# Patient Record
Sex: FEMALE | Race: WHITE | NOT HISPANIC OR LATINO | URBAN - METROPOLITAN AREA
[De-identification: names, ages, dates, MRNs, and addresses within clinical notes are randomized per-mention and may not be internally consistent; named-entity substitution may affect disease eponyms.]

---

## 2017-02-14 ENCOUNTER — OUTPATIENT (OUTPATIENT)
Dept: OUTPATIENT SERVICES | Facility: HOSPITAL | Age: 72
LOS: 1 days | Discharge: HOME | End: 2017-02-14

## 2017-02-14 DIAGNOSIS — H33.20 SEROUS RETINAL DETACHMENT, UNSPECIFIED EYE: Chronic | ICD-10-CM

## 2017-02-14 DIAGNOSIS — Z98.89 OTHER SPECIFIED POSTPROCEDURAL STATES: Chronic | ICD-10-CM

## 2017-06-27 DIAGNOSIS — Z09 ENCOUNTER FOR FOLLOW-UP EXAMINATION AFTER COMPLETED TREATMENT FOR CONDITIONS OTHER THAN MALIGNANT NEOPLASM: ICD-10-CM

## 2017-06-27 DIAGNOSIS — Z78.0 ASYMPTOMATIC MENOPAUSAL STATE: ICD-10-CM

## 2017-06-27 DIAGNOSIS — Z82.62 FAMILY HISTORY OF OSTEOPOROSIS: ICD-10-CM

## 2017-06-27 DIAGNOSIS — M81.0 AGE-RELATED OSTEOPOROSIS WITHOUT CURRENT PATHOLOGICAL FRACTURE: ICD-10-CM

## 2019-02-19 ENCOUNTER — OUTPATIENT (OUTPATIENT)
Dept: OUTPATIENT SERVICES | Facility: HOSPITAL | Age: 74
LOS: 1 days | Discharge: HOME | End: 2019-02-19

## 2019-02-19 DIAGNOSIS — Z98.89 OTHER SPECIFIED POSTPROCEDURAL STATES: Chronic | ICD-10-CM

## 2019-02-19 DIAGNOSIS — H33.20 SEROUS RETINAL DETACHMENT, UNSPECIFIED EYE: Chronic | ICD-10-CM

## 2019-02-20 DIAGNOSIS — Z78.0 ASYMPTOMATIC MENOPAUSAL STATE: ICD-10-CM

## 2019-02-20 DIAGNOSIS — M81.0 AGE-RELATED OSTEOPOROSIS WITHOUT CURRENT PATHOLOGICAL FRACTURE: ICD-10-CM

## 2019-02-20 DIAGNOSIS — Z09 ENCOUNTER FOR FOLLOW-UP EXAMINATION AFTER COMPLETED TREATMENT FOR CONDITIONS OTHER THAN MALIGNANT NEOPLASM: ICD-10-CM

## 2019-02-20 DIAGNOSIS — Z82.62 FAMILY HISTORY OF OSTEOPOROSIS: ICD-10-CM

## 2019-04-29 ENCOUNTER — EMERGENCY (EMERGENCY)
Facility: HOSPITAL | Age: 74
LOS: 0 days | Discharge: HOME | End: 2019-04-29
Attending: EMERGENCY MEDICINE | Admitting: EMERGENCY MEDICINE
Payer: MEDICARE

## 2019-04-29 VITALS
RESPIRATION RATE: 18 BRPM | TEMPERATURE: 98 F | DIASTOLIC BLOOD PRESSURE: 78 MMHG | HEART RATE: 76 BPM | OXYGEN SATURATION: 99 % | SYSTOLIC BLOOD PRESSURE: 127 MMHG

## 2019-04-29 VITALS
TEMPERATURE: 97 F | DIASTOLIC BLOOD PRESSURE: 77 MMHG | OXYGEN SATURATION: 97 % | RESPIRATION RATE: 18 BRPM | HEART RATE: 77 BPM | SYSTOLIC BLOOD PRESSURE: 141 MMHG

## 2019-04-29 DIAGNOSIS — H33.20 SEROUS RETINAL DETACHMENT, UNSPECIFIED EYE: Chronic | ICD-10-CM

## 2019-04-29 DIAGNOSIS — Z79.82 LONG TERM (CURRENT) USE OF ASPIRIN: ICD-10-CM

## 2019-04-29 DIAGNOSIS — R07.0 PAIN IN THROAT: ICD-10-CM

## 2019-04-29 DIAGNOSIS — Z98.89 OTHER SPECIFIED POSTPROCEDURAL STATES: Chronic | ICD-10-CM

## 2019-04-29 DIAGNOSIS — Z79.899 OTHER LONG TERM (CURRENT) DRUG THERAPY: ICD-10-CM

## 2019-04-29 DIAGNOSIS — E03.9 HYPOTHYROIDISM, UNSPECIFIED: ICD-10-CM

## 2019-04-29 LAB
ANION GAP SERPL CALC-SCNC: 14 MMOL/L — SIGNIFICANT CHANGE UP (ref 7–14)
BASOPHILS # BLD AUTO: 0.06 K/UL — SIGNIFICANT CHANGE UP (ref 0–0.2)
BASOPHILS NFR BLD AUTO: 0.6 % — SIGNIFICANT CHANGE UP (ref 0–1)
BUN SERPL-MCNC: 17 MG/DL — SIGNIFICANT CHANGE UP (ref 10–20)
CALCIUM SERPL-MCNC: 10.1 MG/DL — SIGNIFICANT CHANGE UP (ref 8.5–10.1)
CHLORIDE SERPL-SCNC: 100 MMOL/L — SIGNIFICANT CHANGE UP (ref 98–110)
CO2 SERPL-SCNC: 25 MMOL/L — SIGNIFICANT CHANGE UP (ref 17–32)
CREAT SERPL-MCNC: 1 MG/DL — SIGNIFICANT CHANGE UP (ref 0.7–1.5)
EOSINOPHIL # BLD AUTO: 0.04 K/UL — SIGNIFICANT CHANGE UP (ref 0–0.7)
EOSINOPHIL NFR BLD AUTO: 0.4 % — SIGNIFICANT CHANGE UP (ref 0–8)
GLUCOSE SERPL-MCNC: 97 MG/DL — SIGNIFICANT CHANGE UP (ref 70–99)
HCT VFR BLD CALC: 47.3 % — HIGH (ref 37–47)
HGB BLD-MCNC: 15.5 G/DL — SIGNIFICANT CHANGE UP (ref 12–16)
IMM GRANULOCYTES NFR BLD AUTO: 0.7 % — HIGH (ref 0.1–0.3)
LACTATE SERPL-SCNC: 1.1 MMOL/L — SIGNIFICANT CHANGE UP (ref 0.5–2.2)
LYMPHOCYTES # BLD AUTO: 2.27 K/UL — SIGNIFICANT CHANGE UP (ref 1.2–3.4)
LYMPHOCYTES # BLD AUTO: 23.3 % — SIGNIFICANT CHANGE UP (ref 20.5–51.1)
MCHC RBC-ENTMCNC: 28.4 PG — SIGNIFICANT CHANGE UP (ref 27–31)
MCHC RBC-ENTMCNC: 32.8 G/DL — SIGNIFICANT CHANGE UP (ref 32–37)
MCV RBC AUTO: 86.8 FL — SIGNIFICANT CHANGE UP (ref 81–99)
MONOCYTES # BLD AUTO: 0.8 K/UL — HIGH (ref 0.1–0.6)
MONOCYTES NFR BLD AUTO: 8.2 % — SIGNIFICANT CHANGE UP (ref 1.7–9.3)
NEUTROPHILS # BLD AUTO: 6.49 K/UL — SIGNIFICANT CHANGE UP (ref 1.4–6.5)
NEUTROPHILS NFR BLD AUTO: 66.8 % — SIGNIFICANT CHANGE UP (ref 42.2–75.2)
NRBC # BLD: 0 /100 WBCS — SIGNIFICANT CHANGE UP (ref 0–0)
PLATELET # BLD AUTO: 258 K/UL — SIGNIFICANT CHANGE UP (ref 130–400)
POTASSIUM SERPL-MCNC: 4 MMOL/L — SIGNIFICANT CHANGE UP (ref 3.5–5)
POTASSIUM SERPL-SCNC: 4 MMOL/L — SIGNIFICANT CHANGE UP (ref 3.5–5)
RBC # BLD: 5.45 M/UL — HIGH (ref 4.2–5.4)
RBC # FLD: 13 % — SIGNIFICANT CHANGE UP (ref 11.5–14.5)
SODIUM SERPL-SCNC: 139 MMOL/L — SIGNIFICANT CHANGE UP (ref 135–146)
WBC # BLD: 9.73 K/UL — SIGNIFICANT CHANGE UP (ref 4.8–10.8)
WBC # FLD AUTO: 9.73 K/UL — SIGNIFICANT CHANGE UP (ref 4.8–10.8)

## 2019-04-29 PROCEDURE — 70491 CT SOFT TISSUE NECK W/DYE: CPT | Mod: 26

## 2019-04-29 PROCEDURE — 99284 EMERGENCY DEPT VISIT MOD MDM: CPT

## 2019-04-29 RX ORDER — IBUPROFEN 200 MG
1 TABLET ORAL
Qty: 21 | Refills: 0 | OUTPATIENT
Start: 2019-04-29 | End: 2019-05-05

## 2019-04-29 RX ORDER — ONDANSETRON 8 MG/1
4 TABLET, FILM COATED ORAL ONCE
Qty: 0 | Refills: 0 | Status: COMPLETED | OUTPATIENT
Start: 2019-04-29 | End: 2019-04-29

## 2019-04-29 RX ORDER — KETOROLAC TROMETHAMINE 30 MG/ML
30 SYRINGE (ML) INJECTION ONCE
Qty: 0 | Refills: 0 | Status: DISCONTINUED | OUTPATIENT
Start: 2019-04-29 | End: 2019-04-29

## 2019-04-29 RX ORDER — DEXAMETHASONE 0.5 MG/5ML
10 ELIXIR ORAL ONCE
Qty: 0 | Refills: 0 | Status: COMPLETED | OUTPATIENT
Start: 2019-04-29 | End: 2019-04-29

## 2019-04-29 RX ORDER — AMPICILLIN SODIUM AND SULBACTAM SODIUM 250; 125 MG/ML; MG/ML
3 INJECTION, POWDER, FOR SUSPENSION INTRAMUSCULAR; INTRAVENOUS ONCE
Qty: 0 | Refills: 0 | Status: COMPLETED | OUTPATIENT
Start: 2019-04-29 | End: 2019-04-29

## 2019-04-29 RX ADMIN — Medication 30 MILLIGRAM(S): at 13:35

## 2019-04-29 RX ADMIN — AMPICILLIN SODIUM AND SULBACTAM SODIUM 200 GRAM(S): 250; 125 INJECTION, POWDER, FOR SUSPENSION INTRAMUSCULAR; INTRAVENOUS at 10:29

## 2019-04-29 RX ADMIN — AMPICILLIN SODIUM AND SULBACTAM SODIUM 3 GRAM(S): 250; 125 INJECTION, POWDER, FOR SUSPENSION INTRAMUSCULAR; INTRAVENOUS at 11:00

## 2019-04-29 RX ADMIN — Medication 10 MILLIGRAM(S): at 10:29

## 2019-04-29 RX ADMIN — ONDANSETRON 4 MILLIGRAM(S): 8 TABLET, FILM COATED ORAL at 10:45

## 2019-04-29 RX ADMIN — Medication 30 MILLIGRAM(S): at 10:29

## 2019-04-29 NOTE — ED PROVIDER NOTE - OBJECTIVE STATEMENT
Pt is a 73y/o female with a pmhx of hypothyroidism presents today for eval of left sided sore throat x 1 week that has been progressively worsening despite 5 days of Augmentin and full course of prednisone.  Pt denies drooling, n/v/d, fever, chills, weakness, numbness.

## 2019-04-29 NOTE — ED ADULT TRIAGE NOTE - CHIEF COMPLAINT QUOTE
Pt with C/O throat pain ,left ear pain  from Tuesday was DX-with pharyngitis on Augmentin and prednisone  po for 4 days still not t filling well

## 2019-04-29 NOTE — CONSULT NOTE ADULT - ASSESSMENT
74 y.o female with tonsillar abscess vs lesion - no airway compromise, taking PO    ·	would switch oral abx to clinda  ·	pain control  ·	patient has follow up appointment with Dr Chawla in one week, recommend keeping this appt -> if not better with clinda, can perform biopsy/rpt CT  ·	pt aware to return to ED if developing worsening symptoms  ·	D/W Dr Thrasher, ED aware

## 2019-04-29 NOTE — CONSULT NOTE ADULT - SUBJECTIVE AND OBJECTIVE BOX
Pt is a 74 y.o female with history of throat pain x 1 week. Pt states she went to an [facility] on Thursday and received Amoxicillin and Prednisone, saw Dr Chawla (ENT) on Friday and recommended that if she didnt feel better, she should go to the ED for a CT scan. PT states she wasnt feeling any better today in terms of her pain, wasnt able to tolerate regular PO, can tolerate liquids. PT denies any other recent infections, no history of smoking. Pt denies any fever at home, does state she is feeling better after receiving Decadron and Unasyn. Pt denies any SOB/diff breathing.    PAST MEDICAL HISTORY: Diverticulosis, Hypercholesteremia, Degenerative joint disease, Hypothyroid, Detached retina  PAST SURGICAL HISTORY: History of other surgery: Cataracts  Home Medications:  acetaminophen 325 mg oral tablet: 2 tab(s) orally every 6 hours, As needed, For Temp over 38.3 C (100.94 F) (17 Jun 2016 08:14)  alendronate 70 mg oral tablet: 1 tab(s) orally once a week (15 Anish 2016 06:50)  aspirin 325 mg oral delayed release tablet: 1 tab(s) orally 2 times a day  Take for 4 weeks after surgery to prevent blood clots (17 Jun 2016 08:14)  bisacodyl 10 mg rectal suppository: 1 suppository(ies) rectal once a day, As needed, If no bowel movement by postoperative day #2 (17 Jun 2016 08:14)  celecoxib 200 mg oral capsule: 1 cap(s) orally once a day (before a meal) (19 Jun 2016 08:41)  docusate sodium 100 mg oral capsule: 1 cap(s) orally 3 times a day (17 Jun 2016 08:14)  magnesium hydroxide 8% oral suspension: 30 milliliter(s) orally once a day, As needed, Constipation (17 Jun 2016 08:14)  oxyCODONE 5 mg oral tablet: 1-2 tab(s) orally every 4 hours, As needed, Pain (19 Jun 2016 08:41)  OxyCONTIN 20 mg oral tablet, extended release: 1 tab(s) orally every 12 hours (19 Jun 2016 08:41)  polyethylene glycol 3350 oral powder for reconstitution: 17 gram(s) orally once a day (17 Jun 2016 08:14)  senna oral tablet: 2 tab(s) orally once a day (at bedtime), As needed, Constipation (17 Jun 2016 08:14)  simvastatin 20 mg oral tablet: 1 tab(s) orally once a day (at bedtime) (15 Anish 2016 06:50)  Synthroid 150 mcg (0.15 mg) oral tablet: 1 tab(s) orally once a day (15 Anish 2016 06:50)  ALLERGIES: NKDA    Vital Signs: T(F): 98 (29 Apr 2019 14:27), Max: 98 (29 Apr 2019 14:27), HR: 76, BP: 127/78, RR: 18, SpO2: 99%  GEN: NAD, awake and alert. no drooling or pooling of secretions. good vocal quality, no muffles voice  HEENT: NC/AT; oral mucosa pink, + erythema to posterior pharynx, R tonsil>> L tonsil, mild edema noted. uvula midline. neck supple, no palpable LAD.    LABS:                      15.5   9.73  )-----------( 258      ( 29 Apr 2019 10:03 )             47.3     RADIOLOGY:  EXAM:  CT NECK SOFT TISSUE IC        PROCEDURE DATE:  04/29/2019    INTERPRETATION:  Clinical History / Reason for exam: Sore throat/   redness, status post Decadron and antibiotics.    Technique: CT of the neck with contrast. Contiguous CT axial images of   the neck were obtained from the intravenous administration of 100 cc of   Optiray with coronal and sagittal reformats.    Comparison: None available    Findings:    There is apparent mass centered at the left palatine tonsil measuring up   to approximately 1.8 x 2.0 x 2.5 cm (transverse, AP, CC). The mass is   partially obscured due to streak artifact from dental amalgam. There is   incidental central necrosis and/or fluid.    The imaged brain parenchyma demonstrates no acute abnormality.    The globes and orbits are unremarkable. The paranasal sinuses and   mastoids are well-aerated. Incidentally noted torus mandibularis.    The parotid and the submandibular glands are unremarkable.    There is an enlarged lymph node within the right level 2 station   measuring 1.4 x 1.4 x 1.7 cm, series 2 image 59. No other CT evidence of   pathologic cervical lymphadenopathy.    The thyroid gland is unremarkable.    The visualized lung apices intracranial focal consolidation.    There is mild cervical spine degenerative changes with mild   anterolisthesis C6-7.    IMPRESSION:    1.  Apparent mass centered at the left palatine tonsil measuring 2.5.   Diagnostic concerns include infection/abscess versus neoplasm. Recommend   ENTconsultation for direct visualization and tissue sampling.    2.  Mildly enlarged (1.7 cm) right level II lymph node, nonspecific.    Discussed with VANCE Raymundo 12:45 pm 4/29/19    ZAINAB HESS M.D., ATTENDING RADIOLOGIST  This document has been electronically signed. Apr 29 2019 12:45PM

## 2019-04-29 NOTE — ED ADULT NURSE NOTE - OBJECTIVE STATEMENT
Patient presents with sore throat. Was being treated with abt and steroids but states no relief. Hx of hypothyroid and high cholesterol.

## 2019-04-29 NOTE — ED PROVIDER NOTE - PHYSICAL EXAMINATION
VITAL SIGNS: I have reviewed nursing notes and confirm.  CONSTITUTIONAL: Well-developed; well-nourished; in no acute distress.   SKIN: skin exam is warm and dry, no acute rash.    HEAD: Normocephalic; atraumatic.  EYES:  conjunctiva and sclera clear.  ENT: mild erythema/edema to left oropharyx, airway patent, uvula midline, No nasal discharge; airway clear.  CARD: S1, S2 normal; no murmurs, gallops, or rubs. Regular rate and rhythm.   RESP: No wheezes, rales or rhonchi.  EXT: Normal ROM.  No clubbing, cyanosis or edema.   NEURO: Alert, oriented, grossly unremarkable

## 2019-04-29 NOTE — ED PROVIDER NOTE - PROGRESS NOTE DETAILS
ATTENDING NOTE: 73 y/o female c/o sore throat and severe odynophagia x 1 week. No fever. No trauma. No neck pain. Completed 5 days of Augmentin and prednisone with no improvement. O/E: Constitutional: Non-toxic in appearance. No respiratory difficulty. Eyes: No pallor, no jaundice. ENT: Mild erythema to tonsils, L>R, no exudates. Uvula midline. No peritonsillar bulging. Airway patent. Neck: Neck supple, no meningeal signs. Respiratory: Lungs CTA and equal b/l. Cardio: S1 S2 regular, no murmur. ABD: ABD soft, no tenderness. Extremities: No pedal edema, no calf tenderness. Skin: No skin rash. Neuro: No neurologic deficits.   CT --> mass vs abscess in area of left palatine tonsil.  A/P: Will consult ENT. discussed case with ENT, will see pt

## 2019-04-29 NOTE — ED PROVIDER NOTE - NS ED ROS FT
ENMT:  + sore throat No hearing changes, pain, discharge  No neck pain or stiffness.  Cardiac:  No chest pain, SOB or edema.  Respiratory:  No cough or respiratory distress. No hemoptysis. No history of asthma or RAD.  GI:  No nausea, vomiting, diarrhea or abdominal pain.  MS:  No myalgia, muscle weakness, joint pain or back pain.  Neuro:  No headache or weakness.  No LOC.  Skin:  No skin rash.   Endocrine: + hypothyroidism  Except as documented in the HPI,  all other systems are negative.

## 2019-04-29 NOTE — ED PROVIDER NOTE - CARE PROVIDER_API CALL
Prasad Thrasher)  Otolaryngology  [address] [place] [address], [address] [place] [address]  Phone: [phone]  Fax: [phone]  Follow Up Time:

## 2019-05-02 ENCOUNTER — APPOINTMENT (OUTPATIENT)
Dept: OTOLARYNGOLOGY | Facility: CLINIC | Age: 74
End: 2019-05-02
Payer: MEDICARE

## 2019-05-02 DIAGNOSIS — E03.9 HYPOTHYROIDISM, UNSPECIFIED: ICD-10-CM

## 2019-05-02 DIAGNOSIS — Z80.1 FAMILY HISTORY OF MALIGNANT NEOPLASM OF TRACHEA, BRONCHUS AND LUNG: ICD-10-CM

## 2019-05-02 DIAGNOSIS — Z78.9 OTHER SPECIFIED HEALTH STATUS: ICD-10-CM

## 2019-05-02 DIAGNOSIS — Z80.3 FAMILY HISTORY OF MALIGNANT NEOPLASM OF BREAST: ICD-10-CM

## 2019-05-02 DIAGNOSIS — Z83.2 FAMILY HISTORY OF DISEASES OF THE BLOOD AND BLOOD-FORMING ORGANS AND CERTAIN DISORDERS INVOLVING THE IMMUNE MECHANISM: ICD-10-CM

## 2019-05-02 DIAGNOSIS — Z80.0 FAMILY HISTORY OF MALIGNANT NEOPLASM OF DIGESTIVE ORGANS: ICD-10-CM

## 2019-05-02 DIAGNOSIS — E78.5 HYPERLIPIDEMIA, UNSPECIFIED: ICD-10-CM

## 2019-05-02 DIAGNOSIS — H26.9 UNSPECIFIED CATARACT: ICD-10-CM

## 2019-05-02 PROBLEM — Z00.00 ENCOUNTER FOR PREVENTIVE HEALTH EXAMINATION: Status: ACTIVE | Noted: 2019-05-02

## 2019-05-02 PROCEDURE — 31575 DIAGNOSTIC LARYNGOSCOPY: CPT

## 2019-05-02 PROCEDURE — 99203 OFFICE O/P NEW LOW 30 MIN: CPT | Mod: 25

## 2019-05-02 RX ORDER — LEVOTHYROXINE SODIUM 137 UG/1
TABLET ORAL
Refills: 0 | Status: ACTIVE | COMMUNITY

## 2019-05-02 RX ORDER — SIMVASTATIN 80 MG/1
TABLET, FILM COATED ORAL
Refills: 0 | Status: ACTIVE | COMMUNITY

## 2019-05-02 RX ORDER — DENOSUMAB 60 MG/ML
INJECTION SUBCUTANEOUS
Refills: 0 | Status: ACTIVE | COMMUNITY

## 2019-05-02 NOTE — PROCEDURE
[Lesion] : lesion identified by mirror examination needing further evaluation [Topical Lidocaine] : topical lidocaine [Oxymetazoline HCl] : oxymetazoline HCl [Flexible Endoscope] : examined with the flexible endoscope [Normal] : the false vocal folds were pink and regular, the ventricular sulcus was open, the true vocal folds were glistening white, tense and of equal length, mobility, and height

## 2019-05-08 ENCOUNTER — APPOINTMENT (OUTPATIENT)
Dept: OTOLARYNGOLOGY | Facility: CLINIC | Age: 74
End: 2019-05-08
Payer: MEDICARE

## 2019-05-08 DIAGNOSIS — Z87.09 PERSONAL HISTORY OF OTHER DISEASES OF THE RESPIRATORY SYSTEM: ICD-10-CM

## 2019-05-08 PROCEDURE — 99212 OFFICE O/P EST SF 10 MIN: CPT

## 2019-05-08 NOTE — PROCEDURE
[Topical Lidocaine] : topical lidocaine [Lesion] : lesion identified by mirror examination needing further evaluation [Oxymetazoline HCl] : oxymetazoline HCl [Normal] : posterior cricoid area had healthy pink mucosa in the interarytenoid area and the esophageal inlet

## 2019-05-08 NOTE — HISTORY OF PRESENT ILLNESS
[FreeTextEntry1] : pt is doing well overall since talking clindamycin. She is able to open her mouth and she has decreased pain. She feels decreased swelling as well.

## 2019-05-08 NOTE — HISTORY OF PRESENT ILLNESS
[de-identified] : 74 Year old female comes in the office as a new patient who was in the ER on 4/29/19. Pt wasn’t given a diagnosis, but was told she had some sort of mass behind her left tonsil. [FreeTextEntry1] : Patient here today following up on tonsillar mass. Patient admits feeling better. Still having some discomfort when swallowing.

## 2019-05-08 NOTE — REASON FOR VISIT
[Initial Evaluation] : an initial evaluation for [Subsequent Evaluation] : a subsequent evaluation for [FreeTextEntry2] : tonsillar mass

## 2019-05-08 NOTE — HISTORY OF PRESENT ILLNESS
[de-identified] : 74 Year old female comes in the office as a new patient who was in the ER on 4/29/19. Pt wasn’t given a diagnosis, but was told she had some sort of mass behind her left tonsil. [FreeTextEntry1] : Patient here today following up on tonsillar mass. Patient admits feeling better. Still having some discomfort when swallowing.

## 2019-05-08 NOTE — PHYSICAL EXAM
[Normal] : mucosa is normal [Midline] : trachea located in midline position [de-identified] : decreased swelling

## 2019-05-08 NOTE — HISTORY OF PRESENT ILLNESS
[de-identified] : 74 Year old female comes in the office as a new patient who was in the ER on 4/29/19. Pt wasn’t given a diagnosis, but was told she had some sort of mass behind her left tonsil. [FreeTextEntry1] : Patient here today following up on tonsillar mass. Patient admits feeling better. Still having some discomfort when swallowing.

## 2019-05-29 ENCOUNTER — APPOINTMENT (OUTPATIENT)
Dept: OTOLARYNGOLOGY | Facility: CLINIC | Age: 74
End: 2019-05-29
Payer: MEDICARE

## 2019-05-29 PROCEDURE — 31575 DIAGNOSTIC LARYNGOSCOPY: CPT

## 2019-05-29 PROCEDURE — 99212 OFFICE O/P EST SF 10 MIN: CPT | Mod: 25

## 2019-05-29 NOTE — PROCEDURE
[Lesion] : lesion identified by mirror examination needing further evaluation [Oxymetazoline HCl] : oxymetazoline HCl [Topical Lidocaine] : topical lidocaine [Flexible Endoscope] : examined with the flexible endoscope [Normal] : the false vocal folds were pink and regular, the ventricular sulcus was open, the true vocal folds were glistening white, tense and of equal length, mobility, and height

## 2019-05-29 NOTE — PHYSICAL EXAM
[de-identified] : mild fullness in the left neck  [Midline] : trachea located in midline position [de-identified] : left tonsilliths [Normal] : no rashes

## 2019-05-29 NOTE — HISTORY OF PRESENT ILLNESS
[FreeTextEntry1] : Patient following up on tonsillar mass. Patient admits feeling the same. She continues to have swelling. Patient has finished her course of antibiotics.

## 2019-05-31 ENCOUNTER — FORM ENCOUNTER (OUTPATIENT)
Age: 74
End: 2019-05-31

## 2019-06-01 ENCOUNTER — OUTPATIENT (OUTPATIENT)
Dept: OUTPATIENT SERVICES | Facility: HOSPITAL | Age: 74
LOS: 1 days | Discharge: HOME | End: 2019-06-01
Payer: MEDICARE

## 2019-06-01 DIAGNOSIS — Z98.89 OTHER SPECIFIED POSTPROCEDURAL STATES: Chronic | ICD-10-CM

## 2019-06-01 DIAGNOSIS — R22.0 LOCALIZED SWELLING, MASS AND LUMP, HEAD: ICD-10-CM

## 2019-06-01 DIAGNOSIS — H33.20 SEROUS RETINAL DETACHMENT, UNSPECIFIED EYE: Chronic | ICD-10-CM

## 2019-06-01 PROCEDURE — 70542 MRI ORBIT/FACE/NECK W/DYE: CPT | Mod: 26

## 2019-06-12 ENCOUNTER — APPOINTMENT (OUTPATIENT)
Dept: OTOLARYNGOLOGY | Facility: CLINIC | Age: 74
End: 2019-06-12
Payer: MEDICARE

## 2019-06-12 DIAGNOSIS — R22.0 LOCALIZED SWELLING, MASS AND LUMP, HEAD: ICD-10-CM

## 2019-06-12 PROCEDURE — 99214 OFFICE O/P EST MOD 30 MIN: CPT | Mod: 25

## 2019-06-12 PROCEDURE — 31231 NASAL ENDOSCOPY DX: CPT

## 2019-06-12 RX ORDER — CLINDAMYCIN HYDROCHLORIDE 300 MG/1
300 CAPSULE ORAL EVERY 6 HOURS
Qty: 40 | Refills: 2 | Status: ACTIVE | COMMUNITY
Start: 2019-05-02 | End: 1900-01-01

## 2019-06-12 NOTE — PROCEDURE
[Recalcitrant Symptoms] : recalcitrant symptoms  [Topical Lidocaine] : topical lidocaine [Oxymetazoline HCl] : oxymetazoline HCl [Deviated to the Lt] : deviated to the left [Normal] : the paranasal sinuses had no abnormalities [Congested] : congested [FreeTextEntry6] : The following anatomic sites were directly examined in a sequential fashion:\par The scope was introduced in the nasal passage between the middle and inferior turbinates to exam the inferior portion of the middle meatus and the fontanelle, as well as the maxillary ostia. Next, the scope was passed medically and posteriorly to the middle turbinates to examine the sphenoethmoid recess and the superior turbinate region.\par \par severe turbinate hypertrophy

## 2019-06-12 NOTE — PHYSICAL EXAM
[Midline] : trachea located in midline position [Normal] : no rashes [de-identified] : mild fullness in the left neck  [de-identified] : severe hypertrophy [de-identified] : left tonsilliths

## 2019-06-12 NOTE — HISTORY OF PRESENT ILLNESS
[FreeTextEntry1] : Patient following up on tonsillar mass. Patient had MRI performed that was reviewed by me and with the patient that shows no residual mass. Pt has mild foreign body sensation. \par Pt has severe nasal  obstruction that is worse on the left than the right. Pt has not had any improvement on flonase and saline.

## 2019-06-12 NOTE — ASSESSMENT
[FreeTextEntry1] : Risks, benefits, and alternatives of  turbinate reduction were explained including but not limited to bleeding, infection, persistent symptoms, numbness, perforation, change in smell, change in taste, need for additional surgery, etc...\par

## 2019-09-25 ENCOUNTER — OTHER (OUTPATIENT)
Age: 74
End: 2019-09-25

## 2019-09-25 RX ORDER — DIAZEPAM 10 MG/1
10 TABLET ORAL
Qty: 2 | Refills: 0 | Status: ACTIVE | COMMUNITY
Start: 2019-09-25 | End: 1900-01-01

## 2019-09-26 ENCOUNTER — APPOINTMENT (OUTPATIENT)
Dept: OTOLARYNGOLOGY | Facility: CLINIC | Age: 74
End: 2019-09-26
Payer: MEDICARE

## 2019-09-26 DIAGNOSIS — R22.0 LOCALIZED SWELLING, MASS AND LUMP, HEAD: ICD-10-CM

## 2019-09-26 PROCEDURE — 30802 ABLATE INF TURBINATE SUBMUC: CPT

## 2019-09-26 PROCEDURE — 21501 I&D DP ABSC/HMTMA SFT TS NCK: CPT

## 2019-09-26 PROCEDURE — 30117 REMOVAL OF INTRANASAL LESION: CPT

## 2019-10-03 ENCOUNTER — APPOINTMENT (OUTPATIENT)
Dept: OTOLARYNGOLOGY | Facility: CLINIC | Age: 74
End: 2019-10-03
Payer: MEDICARE

## 2019-10-03 PROCEDURE — 31237 NSL/SINS NDSC SURG BX POLYPC: CPT | Mod: 58,RT

## 2019-10-03 PROCEDURE — 99024 POSTOP FOLLOW-UP VISIT: CPT

## 2019-10-03 PROCEDURE — 31238 NSL/SINS NDSC SRG NSL HEMRRG: CPT | Mod: 59,LT

## 2019-10-03 NOTE — PROCEDURE
[Debridement] : debridement  [Post-Op Patency] : post-op patency [Topical Lidocaine] : topical lidocaine [Oxymetazoline HCl] : oxymetazoline HCl [Rigid Endoscope] : examined with a rigid endoscope [Cauterized w/Silver Nitrate] : the bleeding was cauterized with silver nitrate [Nasal Septum Mucosa Bleeding Left] : bleeding on the left [Bilateral] : bilateral debridement of the nasal cavity [Normal] : the paranasal sinuses had no abnormalities [Severe] : severe [Nikos] : on both sides [Removed] : which was removed

## 2019-10-03 NOTE — PHYSICAL EXAM
[Nasal Endoscopy Performed] : nasal endoscopy was performed, see procedure section for findings [Normal] : mucosa is normal [Midline] : trachea located in midline position [de-identified] : scabbing and crusting  [de-identified] : thickened

## 2019-10-03 NOTE — HISTORY OF PRESENT ILLNESS
[FreeTextEntry1] : 10/2/19 Patient is s/p turbinate reduction 9/26/19. Patient presents with complaints epistaxis out of the left nostril which began yesterday. She states bleeding began shortly after using her saline spray.

## 2019-10-08 ENCOUNTER — APPOINTMENT (OUTPATIENT)
Dept: OTOLARYNGOLOGY | Facility: CLINIC | Age: 74
End: 2019-10-08
Payer: MEDICARE

## 2019-10-08 PROCEDURE — 31231 NASAL ENDOSCOPY DX: CPT | Mod: 58

## 2019-10-08 PROCEDURE — 99213 OFFICE O/P EST LOW 20 MIN: CPT | Mod: 25

## 2019-10-08 RX ORDER — AMOXICILLIN AND CLAVULANATE POTASSIUM 875; 125 MG/1; MG/1
875-125 TABLET, COATED ORAL
Qty: 20 | Refills: 0 | Status: ACTIVE | COMMUNITY
Start: 2019-04-25

## 2019-10-08 RX ORDER — AMOXICILLIN 500 MG/1
500 CAPSULE ORAL
Qty: 30 | Refills: 0 | Status: ACTIVE | COMMUNITY
Start: 2019-07-09

## 2019-10-08 RX ORDER — PREDNISONE 20 MG/1
20 TABLET ORAL
Qty: 8 | Refills: 0 | Status: ACTIVE | COMMUNITY
Start: 2019-04-25

## 2019-10-08 RX ORDER — METRONIDAZOLE 250 MG/1
250 TABLET ORAL
Qty: 21 | Refills: 0 | Status: ACTIVE | COMMUNITY
Start: 2019-05-29

## 2019-10-08 RX ORDER — IBUPROFEN 800 MG/1
800 TABLET, FILM COATED ORAL
Qty: 21 | Refills: 0 | Status: ACTIVE | COMMUNITY
Start: 2019-04-29

## 2019-10-08 RX ORDER — CIPROFLOXACIN HYDROCHLORIDE 500 MG/1
500 TABLET, FILM COATED ORAL
Qty: 14 | Refills: 0 | Status: ACTIVE | COMMUNITY
Start: 2019-05-29

## 2019-10-08 NOTE — ASSESSMENT
[FreeTextEntry1] : - discussed nose bleed precautions - how to manage them when they occur and how to prevent them. Use petroleum jelly or Aquaphor twice a day and use nasal saline spray several times a day to minimize risk of nasal dryness. Hold pressure to nose for 10 minutes x 2. If bleeding persists, notify office. \par - f/up with Dr Thrasher as scheduled

## 2019-10-08 NOTE — PHYSICAL EXAM
[Nasal Endoscopy Performed] : nasal endoscopy was performed, see procedure section for findings [Midline] : trachea located in midline position [Normal] : cardiovascular peripheral examination is normal

## 2019-10-10 ENCOUNTER — APPOINTMENT (OUTPATIENT)
Dept: OTOLARYNGOLOGY | Facility: CLINIC | Age: 74
End: 2019-10-10
Payer: MEDICARE

## 2019-10-10 PROCEDURE — 99212 OFFICE O/P EST SF 10 MIN: CPT | Mod: 25

## 2019-10-10 PROCEDURE — 31237 NSL/SINS NDSC SURG BX POLYPC: CPT | Mod: LT,58

## 2019-10-10 NOTE — PROCEDURE
[Epistaxis] : evaluation of epistaxis [Rigid Endoscope] : examined with a rigid endoscope [Topical Lidocaine] : topical lidocaine [Nasal Mucosa] : purulence on the left [Congested] : congested [Normal] : the paranasal sinuses had no abnormalities

## 2019-10-10 NOTE — HISTORY OF PRESENT ILLNESS
[FreeTextEntry1] : Pt has had intermittent bleeding and has constant sneezing. Pt has taken non bleeding precautions. Pt had some nasal discharge on the pillow yesterday and her pillow was dry.

## 2019-10-10 NOTE — PHYSICAL EXAM
[Nasal Endoscopy Performed] : nasal endoscopy was performed, see procedure section for findings [Midline] : trachea located in midline position [Normal] : mucosa is normal

## 2019-10-16 ENCOUNTER — APPOINTMENT (OUTPATIENT)
Dept: OTOLARYNGOLOGY | Facility: CLINIC | Age: 74
End: 2019-10-16

## 2019-10-17 ENCOUNTER — APPOINTMENT (OUTPATIENT)
Dept: OTOLARYNGOLOGY | Facility: CLINIC | Age: 74
End: 2019-10-17
Payer: MEDICARE

## 2019-10-17 PROCEDURE — 99212 OFFICE O/P EST SF 10 MIN: CPT | Mod: 25

## 2019-10-17 PROCEDURE — 31231 NASAL ENDOSCOPY DX: CPT | Mod: 58

## 2019-10-17 NOTE — HISTORY OF PRESENT ILLNESS
[FreeTextEntry1] : Patient following up on epistaxis.  Pt had one episode this past weekend and she was able to control it. Pt has been doing well since then and has not had any bleeds.

## 2019-10-17 NOTE — PROCEDURE
[Epistaxis] : evaluation of epistaxis [Topical Lidocaine] : topical lidocaine [Oxymetazoline HCl] : oxymetazoline HCl [Rigid Endoscope] : examined with a rigid endoscope [Nasal Mucosa] : purulence on the left [Normal] : the paranasal sinuses had no abnormalities [FreeTextEntry6] : The following anatomic sites were directly examined in a sequential fashion:\par The scope was introduced in the nasal passage between the middle and inferior turbinates to exam the inferior portion of the middle meatus and the fontanelle, as well as the maxillary ostia. Next, the scope was passed medically and posteriorly to the middle turbinates to examine the sphenoethmoid recess and the superior turbinate region.\par

## 2019-11-14 ENCOUNTER — APPOINTMENT (OUTPATIENT)
Dept: OTOLARYNGOLOGY | Facility: CLINIC | Age: 74
End: 2019-11-14
Payer: MEDICARE

## 2019-11-14 DIAGNOSIS — J34.3 HYPERTROPHY OF NASAL TURBINATES: ICD-10-CM

## 2019-11-14 PROCEDURE — 99212 OFFICE O/P EST SF 10 MIN: CPT | Mod: 25

## 2019-11-14 PROCEDURE — 31231 NASAL ENDOSCOPY DX: CPT | Mod: 58

## 2019-11-14 RX ORDER — AZITHROMYCIN 250 MG/1
250 TABLET, FILM COATED ORAL
Qty: 6 | Refills: 0 | Status: ACTIVE | COMMUNITY
Start: 2019-11-14 | End: 1900-01-01

## 2019-11-14 RX ORDER — LEVOCETIRIZINE DIHYDROCHLORIDE 5 MG/1
5 TABLET ORAL DAILY
Qty: 30 | Refills: 2 | Status: ACTIVE | COMMUNITY
Start: 2019-11-14 | End: 1900-01-01

## 2019-11-14 RX ORDER — AZELASTINE HYDROCHLORIDE 205.5 UG/1
0.15 SPRAY, METERED NASAL
Qty: 1 | Refills: 6 | Status: ACTIVE | COMMUNITY
Start: 2019-11-14 | End: 1900-01-01

## 2019-11-14 NOTE — PROCEDURE
[Epistaxis] : evaluation of epistaxis [Recalcitrant Symptoms] : recalcitrant symptoms  [Topical Lidocaine] : topical lidocaine [Oxymetazoline HCl] : oxymetazoline HCl [Rigid Endoscope] : examined with a rigid endoscope [Congested] : congested [Normal] : the nasopharynx was normal

## 2019-11-14 NOTE — PHYSICAL EXAM
[Nasal Endoscopy Performed] : nasal endoscopy was performed, see procedure section for findings [Normal] : lingual tonsils are normal [Midline] : trachea located in midline position

## 2019-11-14 NOTE — REVIEW OF SYSTEMS
[Patient Intake Form Reviewed] : Patient intake form was reviewed [As Noted in HPI] : as noted in HPI [Negative] : Endocrine [FreeTextEntry1] : all other ROS negative

## 2019-11-14 NOTE — HISTORY OF PRESENT ILLNESS
[FreeTextEntry1] : Patient following up on epistaxis and nasal hypertrophy. Patient c/o persistent head cold. She took Tylenol cold with no improvement. Patient has constant rhinitis. Patient had bleeding on 10/22; blood clots from left nostril. Excessive sneezing. Patient is not using any nasal sprays. She uses humidifier daily. Denies use of nasal sprays, has stopped using nasal sprays since episodes of epistaxis.

## 2020-02-19 NOTE — PHYSICAL EXAM
Spoke with patient, all labs of urine and culture have been sent to urologist Dr Gerson Turcios. Patient is holding on taking antibiotic prescribed by Dr Mcnally as she would like to consult with her urologist first as he is very concerned of antibiotic resistance. Patient will contact the office to let us know plan of urology so that she does not go untreated. Patient very pleased and calling urology right away.    [Midline] : trachea located in midline position [Normal] : mucosa is normal

## 2020-02-26 ENCOUNTER — APPOINTMENT (OUTPATIENT)
Dept: OTOLARYNGOLOGY | Facility: CLINIC | Age: 75
End: 2020-02-26
Payer: MEDICARE

## 2020-02-26 DIAGNOSIS — R04.0 EPISTAXIS: ICD-10-CM

## 2020-02-26 PROCEDURE — 31231 NASAL ENDOSCOPY DX: CPT

## 2020-02-26 PROCEDURE — 99213 OFFICE O/P EST LOW 20 MIN: CPT | Mod: 25

## 2020-02-26 RX ORDER — IPRATROPIUM BROMIDE 42 UG/1
0.06 SPRAY NASAL 3 TIMES DAILY
Qty: 1 | Refills: 3 | Status: ACTIVE | COMMUNITY
Start: 2020-02-26 | End: 1900-01-01

## 2020-02-26 NOTE — PHYSICAL EXAM
[Nasal Endoscopy Performed] : nasal endoscopy was performed, see procedure section for findings [de-identified] : mild synechiae on the left  [Midline] : trachea located in midline position [Normal] : no rashes

## 2020-02-26 NOTE — HISTORY OF PRESENT ILLNESS
[FreeTextEntry1] : Patient following up on nasal hypertrophy. Patient doing well. She continues to use nasal sprays. Patient is s/p turbinates 9/2019. Pt is breathing well through boths sides. Pt has new rhinitis on the left that occurs in the cold air.

## 2020-02-26 NOTE — PROCEDURE
[Epistaxis] : evaluation of epistaxis [Normal] : the middle meatus had no abnormalities [FreeTextEntry6] : The following anatomic sites were directly examined in a sequential fashion:\par The scope was introduced in the nasal passage between the middle and inferior turbinates to exam the inferior portion of the middle meatus and the fontanelle, as well as the maxillary ostia. Next, the scope was passed medically and posteriorly to the middle turbinates to examine the sphenoethmoid recess and the superior turbinate region.\par  [FreeTextEntry1] : mild synechia on the left  [de-identified] : rhinitis

## 2020-12-21 PROBLEM — Z87.09 HISTORY OF ACUTE PHARYNGITIS: Status: RESOLVED | Noted: 2019-05-02 | Resolved: 2020-12-21

## 2021-04-23 ENCOUNTER — OUTPATIENT (OUTPATIENT)
Dept: OUTPATIENT SERVICES | Facility: HOSPITAL | Age: 76
LOS: 1 days | Discharge: HOME | End: 2021-04-23

## 2021-04-23 DIAGNOSIS — H33.20 SEROUS RETINAL DETACHMENT, UNSPECIFIED EYE: Chronic | ICD-10-CM

## 2021-04-23 DIAGNOSIS — Z98.89 OTHER SPECIFIED POSTPROCEDURAL STATES: Chronic | ICD-10-CM

## 2021-04-26 DIAGNOSIS — Z78.0 ASYMPTOMATIC MENOPAUSAL STATE: ICD-10-CM

## 2021-04-26 DIAGNOSIS — Z09 ENCOUNTER FOR FOLLOW-UP EXAMINATION AFTER COMPLETED TREATMENT FOR CONDITIONS OTHER THAN MALIGNANT NEOPLASM: ICD-10-CM

## 2021-04-26 DIAGNOSIS — Z82.62 FAMILY HISTORY OF OSTEOPOROSIS: ICD-10-CM

## 2021-04-26 DIAGNOSIS — M81.0 AGE-RELATED OSTEOPOROSIS WITHOUT CURRENT PATHOLOGICAL FRACTURE: ICD-10-CM

## 2021-06-22 NOTE — REASON FOR VISIT
[Subsequent Evaluation] : a subsequent evaluation for [FreeTextEntry2] : epistaxis, nasal hypertrophy  cardiac precautions/fall precautions

## 2021-07-14 ENCOUNTER — APPOINTMENT (OUTPATIENT)
Dept: OTOLARYNGOLOGY | Facility: CLINIC | Age: 76
End: 2021-07-14
Payer: MEDICARE

## 2021-07-14 DIAGNOSIS — J30.9 ALLERGIC RHINITIS, UNSPECIFIED: ICD-10-CM

## 2021-07-14 DIAGNOSIS — J03.90 ACUTE TONSILLITIS, UNSPECIFIED: ICD-10-CM

## 2021-07-14 DIAGNOSIS — J30.0 VASOMOTOR RHINITIS: ICD-10-CM

## 2021-07-14 DIAGNOSIS — J35.8 OTHER CHRONIC DISEASES OF TONSILS AND ADENOIDS: ICD-10-CM

## 2021-07-14 DIAGNOSIS — R13.10 DYSPHAGIA, UNSPECIFIED: ICD-10-CM

## 2021-07-14 DIAGNOSIS — J34.89 OTHER SPECIFIED DISORDERS OF NOSE AND NASAL SINUSES: ICD-10-CM

## 2021-07-14 PROCEDURE — 31231 NASAL ENDOSCOPY DX: CPT

## 2021-07-14 PROCEDURE — 99213 OFFICE O/P EST LOW 20 MIN: CPT | Mod: 25

## 2021-07-14 RX ORDER — AMOXICILLIN AND CLAVULANATE POTASSIUM 875; 125 MG/1; MG/1
875-125 TABLET, COATED ORAL
Qty: 20 | Refills: 0 | Status: ACTIVE | COMMUNITY
Start: 2021-07-14 | End: 1900-01-01

## 2021-07-14 NOTE — PHYSICAL EXAM
[de-identified] : synechiae  [Normal] : mucosa is normal [Midline] : trachea located in midline position

## 2021-07-14 NOTE — HISTORY OF PRESENT ILLNESS
[FreeTextEntry1] : Pt has 1 day of difficulty swallowing and painful swallowing. Pt feels that her throat is swollen. Pt has persistent PND and pt takes spray and it is effective. Pt has history of tonsil issues in the past.

## 2021-07-14 NOTE — PROCEDURE
[Recalcitrant Symptoms] : recalcitrant symptoms  [None] : none [Congested] : congested [S-Shaped Deviated] : S-shape deviation [FreeTextEntry6] : synechiae on the left, deviated septum [Flexible Endoscope] : examined with the flexible endoscope [True Vocal Cords Erythematous] : bilateral true vocal cord edema [Glottis Arytenoid Cartilages Erythema] : bilateral arytenoid ~M erythema [Normal] : posterior cricoid area had healthy pink mucosa in the interarytenoid area and the esophageal inlet

## 2022-05-18 ENCOUNTER — RX RENEWAL (OUTPATIENT)
Age: 77
End: 2022-05-18

## 2022-05-18 RX ORDER — LEVOCETIRIZINE DIHYDROCHLORIDE 5 MG/1
5 TABLET ORAL
Qty: 30 | Refills: 0 | Status: ACTIVE | COMMUNITY
Start: 2021-07-14 | End: 1900-01-01

## 2022-11-17 ENCOUNTER — APPOINTMENT (OUTPATIENT)
Dept: NEUROLOGY | Facility: CLINIC | Age: 77
End: 2022-11-17

## 2022-11-17 VITALS
WEIGHT: 195 LBS | BODY MASS INDEX: 30.61 KG/M2 | DIASTOLIC BLOOD PRESSURE: 98 MMHG | HEIGHT: 67 IN | HEART RATE: 81 BPM | SYSTOLIC BLOOD PRESSURE: 171 MMHG

## 2022-11-17 DIAGNOSIS — R41.3 OTHER AMNESIA: ICD-10-CM

## 2022-11-17 DIAGNOSIS — Z87.39 PERSONAL HISTORY OF OTHER DISEASES OF THE MUSCULOSKELETAL SYSTEM AND CONNECTIVE TISSUE: ICD-10-CM

## 2022-11-17 DIAGNOSIS — Z86.79 PERSONAL HISTORY OF OTHER DISEASES OF THE CIRCULATORY SYSTEM: ICD-10-CM

## 2022-11-17 DIAGNOSIS — Z86.39 PERSONAL HISTORY OF OTHER ENDOCRINE, NUTRITIONAL AND METABOLIC DISEASE: ICD-10-CM

## 2022-11-17 PROCEDURE — 99204 OFFICE O/P NEW MOD 45 MIN: CPT

## 2022-11-17 NOTE — REVIEW OF SYSTEMS
[Memory Lapses or Loss] : memory loss [Decr. Concentrating Ability] : decreased concentrating ability [Easy Bleeding] : a tendency for easy bleeding

## 2022-11-18 NOTE — PHYSICAL EXAM
[FreeTextEntry1] : PHYSICAL EXAMINATION:\par Head: Normocephalic, atraumatic. Negative TA tenderness/prominence. \par \par Neck: Supple with full range of motion; nontender with negative bilateral Spurling's signs. \par \par Spine: Full range of motion; nontender. Negative straight leg raise maneuvers. \par \par Extremities: Non-tender. Atraumatic. Negative Tinel's signs. \par \par NEUROLOGICAL EXAMINATION: \par \par Mental Status: Patient scored 27/30 or 29/30 depending on whether subtractions were included. She has a tendency to laugh inappropriately when she is asked a question she has difficulty with. She also has tendency to look at her  when she is unsure how to answer a question.\par \par Cranial Nerves Cranial Nerves: \par \par II, III, IV, VI: Pupils are equal, round, and reactive to light and accommodation. No evidence of afferent pupillary defect. Visual fields are full to confrontation. Eye movements are full without evidence of nystagmus or internuclear ophthalmoplegia. Funduscopic examination reveals sharp disc margins. \par \par V: Normal jaw movements. Normal facial sensation. \par \par VII: Normal facial motor testing. \par \par VIII: Grossly normal hearing bilaterally. \par \par IX, X: Palate moves symmetrically. No dysarthria. \par \par XI: Normal shoulder shrug and sternocleidomastoid power. \par \par XII: Tongue appears normal and protrudes in the midline. \par \par Motor: Normal bulk, tone, and power throughout. \par \par Muscle Stretch Reflexes (right/left): 2+ symmetrical. \par \par Plantar Responses: Flexor bilaterally. \par \par Coordination: Normal finger to nose and heel to shin testing, no truncal ataxia and no tremor. \par \par Sensation: Normal primary sensation. Normal double simultaneous stimulation. \par \par Gait and Station: Normal base, stride, and turning. Normal toe and heel walking. Normal tandem. Negative Romberg.\par

## 2022-11-18 NOTE — HISTORY OF PRESENT ILLNESS
[FreeTextEntry1] : Ms. Prado is a 77-year-old woman who presents today in neurologic consultation accompanied by her  for problems with memory, recall, and cognitive impairment. This began perhaps sometime around 2015 after she had bilateral total knee replacements with several hours of general anesthesia. It has become more evident within the last year. Patient will lose track of the plot on TV which she was usually astute with. She tends to repeat herself and has to be told the same thing over again. She related a recent anecdote where she dropped a comb from her pocketbook and had to be reminded that she had done so. \par \par Patient had seen Dr. Chawla in the past and had audiometrics. She was told it was satisfactory for her age which means it is not unusual for a patient her age.

## 2022-11-18 NOTE — ASSESSMENT
[FreeTextEntry1] : Impression is that patient has signs of mild cognitive impairment or benign memory deficit of the aged. I suggested that because she has hypertension and elevated cholesterol, we get an MRI of the brain to see if she has significant microvascular small vessel ischemic disease. I did recommend a neuropsychological evaluation, but because of the long wait i suggested she reach out to [facility] for an appointment. \par \par Total clinician time spent today on the patient is 50 minutes including preparing to see the patient, obtaining and/or reviewing and confirming history, performing medically necessary and appropriate examination, counseling and educating the patient and/or family, documenting clinical information in the EHR and communicating and/or referring to other healthcare professionals.\par \par Entered by Neha Damico acting as scribe for Dr. Lynn.\par \par \par The documentation recorded by the scribe, in my presence, accurately reflects the service I personally performed, and the decisions made by me with my edits as appropriate. \par Goyo Lynn MD, FAAN, FACP\par Diplomate American Board of Psychiatry & Neurology\par \par

## 2022-11-18 NOTE — REASON FOR VISIT
[Initial Evaluation] : an initial evaluation [FreeTextEntry1] : PATIENT PRESENTS WITH C/O OF MEMORY ISSUES

## 2022-11-21 ENCOUNTER — APPOINTMENT (OUTPATIENT)
Dept: MRI IMAGING | Facility: CLINIC | Age: 77
End: 2022-11-21

## 2022-11-21 PROCEDURE — 70551 MRI BRAIN STEM W/O DYE: CPT | Mod: MH

## 2022-12-08 ENCOUNTER — APPOINTMENT (OUTPATIENT)
Dept: NEUROLOGY | Facility: CLINIC | Age: 77
End: 2022-12-08

## 2022-12-08 DIAGNOSIS — I67.9 CEREBROVASCULAR DISEASE, UNSPECIFIED: ICD-10-CM

## 2022-12-08 DIAGNOSIS — I10 ESSENTIAL (PRIMARY) HYPERTENSION: ICD-10-CM

## 2022-12-08 DIAGNOSIS — G31.84 MILD COGNITIVE IMPAIRMENT, SO STATED: ICD-10-CM

## 2022-12-08 PROCEDURE — 99214 OFFICE O/P EST MOD 30 MIN: CPT

## 2022-12-09 NOTE — HISTORY OF PRESENT ILLNESS
[FreeTextEntry1] : Ms. Prado is a 77-year-old woman who presents today in neurologic  MRI of the brain which we ordered for impairment of memory and forgetfulness. MRI revealed dilatation of the sulci and fissures within range for age. There was mild temporal lobe and hippocampal volume loss. Scattered punctate periventricular and flair hyperintensities which were nonspecific but likely felt to be due to hypertensive microvascular changes. There was also chronic lacune in the anterior right basal ganglia. Patient's last blood pressure was slightly hypertensive at 171/98. \par

## 2022-12-09 NOTE — ASSESSMENT
[FreeTextEntry1] : Impression is that of cerebrovascular disease and hypertension. I discussed this with her and her  that this was hypertension and needed to be addressed with medication from her internist and also with a plant based, vegetarian diet which will also help reduce her cholesterol. The neuropsychology department at [facility] requested her records which we will forward over to them. At this point, the patient will see me again after consultation with neuropsych and discuss its implications and what their recommendations are in terms of behavioral modifications and strategies for improving her cognitive function and memory. \par \par Entered by Neha Damico acting as scribe for Dr. Lynn.\par \par \par The documentation recorded by the scribe, in my presence, accurately reflects the service I personally performed, and the decisions made by me with my edits as appropriate. \par Goyo Lynn MD, FAAN, FACP\par Diplomate [employer] Board of Psychiatry & Neurology\par \par

## 2023-01-12 ENCOUNTER — APPOINTMENT (OUTPATIENT)
Dept: NEUROLOGY | Facility: CLINIC | Age: 78
End: 2023-01-12

## 2023-09-12 NOTE — HISTORY OF PRESENT ILLNESS
[de-identified] : 10/3/19 Patient is s/p turbinate reduction 9/26/19 in the office. Patient presents with complaints epistaxis out of the left nostril last week, was seen by Dr Jones who cauterized on the left side. Since that time she has been using Bacitracin in the nose, daily. She noted some dried blood on the q-tip. 4 days ago she noted dripping of blood from her nose. This am she had some bleeding from her nose. Lasted about 10 minutes, she placed a tissue inside her nose to help it stop. She did not apply pressure to her nose. \par \par She also reports dizziness.  Lab: [id number] Lab: [id number]

## 2024-03-18 ENCOUNTER — APPOINTMENT (OUTPATIENT)
Dept: ORTHOPEDIC SURGERY | Facility: CLINIC | Age: 79
End: 2024-03-18

## 2025-04-02 ENCOUNTER — OUTPATIENT (OUTPATIENT)
Dept: OUTPATIENT SERVICES | Facility: HOSPITAL | Age: 80
LOS: 1 days | End: 2025-04-02
Payer: MEDICARE

## 2025-04-02 DIAGNOSIS — Z13.820 ENCOUNTER FOR SCREENING FOR OSTEOPOROSIS: ICD-10-CM

## 2025-04-02 DIAGNOSIS — H33.20 SEROUS RETINAL DETACHMENT, UNSPECIFIED EYE: Chronic | ICD-10-CM

## 2025-04-02 DIAGNOSIS — Z98.89 OTHER SPECIFIED POSTPROCEDURAL STATES: Chronic | ICD-10-CM

## 2025-04-02 DIAGNOSIS — Z00.8 ENCOUNTER FOR OTHER GENERAL EXAMINATION: ICD-10-CM

## 2025-04-02 PROCEDURE — 77080 DXA BONE DENSITY AXIAL: CPT | Mod: 26

## 2025-04-02 PROCEDURE — 77080 DXA BONE DENSITY AXIAL: CPT

## 2025-04-03 DIAGNOSIS — Z13.820 ENCOUNTER FOR SCREENING FOR OSTEOPOROSIS: ICD-10-CM

## 2025-04-07 DIAGNOSIS — M81.0 AGE-RELATED OSTEOPOROSIS WITHOUT CURRENT PATHOLOGICAL FRACTURE: ICD-10-CM
